# Patient Record
Sex: FEMALE | Race: WHITE
[De-identification: names, ages, dates, MRNs, and addresses within clinical notes are randomized per-mention and may not be internally consistent; named-entity substitution may affect disease eponyms.]

---

## 2017-07-06 ENCOUNTER — HOSPITAL ENCOUNTER (OUTPATIENT)
Dept: HOSPITAL 47 - MMGSC | Age: 52
Discharge: HOME | End: 2017-07-06
Payer: COMMERCIAL

## 2017-07-06 DIAGNOSIS — R53.83: Primary | ICD-10-CM

## 2017-07-06 DIAGNOSIS — Z84.81: ICD-10-CM

## 2017-07-06 DIAGNOSIS — Z83.49: ICD-10-CM

## 2017-07-06 LAB
ALP SERPL-CCNC: 67 U/L (ref 38–126)
ALT SERPL-CCNC: 36 U/L (ref 9–52)
ANION GAP SERPL CALC-SCNC: 10 MMOL/L
AST SERPL-CCNC: 26 U/L (ref 14–36)
BASOPHILS # BLD AUTO: 0 K/UL (ref 0–0.2)
BASOPHILS NFR BLD AUTO: 1 %
BUN SERPL-SCNC: 18 MG/DL (ref 7–17)
CALCIUM SPEC-MCNC: 9.6 MG/DL (ref 8.4–10.2)
CH: 30.3
CHCM: 34.7
CHLORIDE SERPL-SCNC: 108 MMOL/L (ref 98–107)
CHOLEST SERPL-MCNC: 234 MG/DL (ref ?–200)
CO2 SERPL-SCNC: 25 MMOL/L (ref 22–30)
EOSINOPHIL # BLD AUTO: 0.2 K/UL (ref 0–0.7)
EOSINOPHIL NFR BLD AUTO: 2 %
ERYTHROCYTE [DISTWIDTH] IN BLOOD BY AUTOMATED COUNT: 4.39 M/UL (ref 3.8–5.4)
ERYTHROCYTE [DISTWIDTH] IN BLOOD: 12.9 % (ref 11.5–15.5)
GLUCOSE SERPL-MCNC: 101 MG/DL (ref 74–99)
HCT VFR BLD AUTO: 38.6 % (ref 34–46)
HDLC SERPL-MCNC: 40 MG/DL (ref 40–60)
HDW: 2.82
HGB BLD-MCNC: 13.7 GM/DL (ref 11.4–16)
IRON SERPL-MCNC: 88 UG/DL (ref 37–170)
LUC NFR BLD AUTO: 1 %
LYMPHOCYTES # SPEC AUTO: 1.3 K/UL (ref 1–4.8)
LYMPHOCYTES NFR SPEC AUTO: 19 %
MCH RBC QN AUTO: 31.2 PG (ref 25–35)
MCHC RBC AUTO-ENTMCNC: 35.5 G/DL (ref 31–37)
MCV RBC AUTO: 87.9 FL (ref 80–100)
MONOCYTES # BLD AUTO: 0.3 K/UL (ref 0–1)
MONOCYTES NFR BLD AUTO: 4 %
NEUTROPHILS # BLD AUTO: 4.8 K/UL (ref 1.3–7.7)
NEUTROPHILS NFR BLD AUTO: 73 %
NON-AFRICAN AMERICAN GFR(MDRD): >60
POTASSIUM SERPL-SCNC: 4.5 MMOL/L (ref 3.5–5.1)
PROT SERPL-MCNC: 7.1 G/DL (ref 6.3–8.2)
SODIUM SERPL-SCNC: 143 MMOL/L (ref 137–145)
TIBC SERPL-MCNC: 287 UG/DL (ref 265–497)
TRIGL SERPL-MCNC: 314 MG/DL (ref ?–150)
VIT B12 SERPL-MCNC: 227 PG/ML (ref 239–931)
WBC # BLD AUTO: 0.09 10*3/UL
WBC # BLD AUTO: 6.7 K/UL (ref 3.8–10.6)
WBC (PEROX): 6.66

## 2017-07-06 PROCEDURE — 83550 IRON BINDING TEST: CPT

## 2017-07-06 PROCEDURE — 84443 ASSAY THYROID STIM HORMONE: CPT

## 2017-07-06 PROCEDURE — 80061 LIPID PANEL: CPT

## 2017-07-06 PROCEDURE — 82607 VITAMIN B-12: CPT

## 2017-07-06 PROCEDURE — 80053 COMPREHEN METABOLIC PANEL: CPT

## 2017-07-06 PROCEDURE — 84439 ASSAY OF FREE THYROXINE: CPT

## 2017-07-06 PROCEDURE — 82728 ASSAY OF FERRITIN: CPT

## 2017-07-06 PROCEDURE — 83540 ASSAY OF IRON: CPT

## 2017-07-06 PROCEDURE — 85025 COMPLETE CBC W/AUTO DIFF WBC: CPT

## 2017-07-06 PROCEDURE — 36415 COLL VENOUS BLD VENIPUNCTURE: CPT

## 2017-07-06 PROCEDURE — 81291 MTHFR GENE: CPT

## 2017-07-06 PROCEDURE — 82306 VITAMIN D 25 HYDROXY: CPT

## 2017-09-13 ENCOUNTER — HOSPITAL ENCOUNTER (OUTPATIENT)
Dept: HOSPITAL 47 - MMGSC | Age: 52
Discharge: HOME | End: 2017-09-13
Payer: COMMERCIAL

## 2017-09-13 DIAGNOSIS — Z20.2: Primary | ICD-10-CM

## 2017-09-13 LAB
HEPATITIS B CORE IGM INDEX: 0.04
HEPATITIS B SURFACE AG INDEX: 0.06
HEPATITIS C VIRUS IGG  INDEX: 0.02

## 2017-09-13 PROCEDURE — 80074 ACUTE HEPATITIS PANEL: CPT

## 2017-09-13 PROCEDURE — 87070 CULTURE OTHR SPECIMN AEROBIC: CPT

## 2017-09-13 PROCEDURE — 36415 COLL VENOUS BLD VENIPUNCTURE: CPT

## 2017-09-13 PROCEDURE — 86780 TREPONEMA PALLIDUM: CPT

## 2017-09-13 PROCEDURE — 87205 SMEAR GRAM STAIN: CPT

## 2017-09-13 PROCEDURE — 87491 CHLMYD TRACH DNA AMP PROBE: CPT

## 2017-09-13 PROCEDURE — 87591 N.GONORRHOEAE DNA AMP PROB: CPT

## 2017-09-13 PROCEDURE — 87390 HIV-1 AG IA: CPT

## 2017-09-28 ENCOUNTER — HOSPITAL ENCOUNTER (OUTPATIENT)
Dept: HOSPITAL 47 - SLEEP | Age: 52
End: 2017-09-28
Payer: COMMERCIAL

## 2017-09-28 DIAGNOSIS — G47.33: Primary | ICD-10-CM

## 2017-09-28 DIAGNOSIS — Z98.890: ICD-10-CM

## 2017-09-28 PROCEDURE — 99211 OFF/OP EST MAY X REQ PHY/QHP: CPT

## 2017-09-28 NOTE — CONS
CONSULTATION



A 52-year-old lady who has been evaluated in the [facility] for significant excessive

daytime sleepiness and obstructive sleep apnea-hypopnea syndrome.



HISTORY OF PRESENT ILLNESS/SLEEP-WAKE EVALUATION:

Patient had been diagnosed with moderate obstructive sleep apnea-hypopnea syndrome 5

years ago in different institution.  She was recommended to use CPAP, but she was not

able to use it and basically never had been treated.  At the present time, her sleep

schedule from about 10 pm to 8:10 am on weekdays and from around 10 p.m. until 11, 12

noon on weekends.  She has sometimes problems with falling asleep.  She has TV set in

bedroom.  She wakes up from sleep about 20 times with 1 episode of nocturia. She

snores, has episodes of stopped breathing with episodes of gasping for air and choking,

grinding teeth, sweating.  In the morning she wakes up tired, has difficulties to pay

attention, falling asleep during the day.  [place] Sleepiness Scale significantly

increased to 17.  Sometimes during the naps, she may have vivid dreams. Recently she

rarely takes naps because she does not have time for that.



She has problems with memory, concentration, irritability, depression, anxiety, and

sexual dysfunction.



PAST MEDICAL HISTORY:

Positive for depression, anxiety, posttraumatic stress disorder, anaphylactic shock

possibly to food or aspirin.



PAST SURGICAL HISTORY:

A right breast lumpectomy with benign results.



SOCIAL HISTORY:

Negative for smoking, alcohol consumption occasional.



MEDICATIONS:

Zoloft, Seroquel, and Xanax.



FAMILY HISTORY:

Hypertension, heart problems, hyperlipidemia, stroke, arthritis, sinus headache,

bronchitis, lung problems, sleep apnea, snoring, pneumonia, headaches, cancer,

narcolepsy, acid reflux, ulcers, diabetes mellitus, thyroid problem, mental illness,

restless legs.



REVIEW OF SYSTEMS:

Multiple awakenings from sleep, difficulties to initiate sleep, sleepiness during the

day.  Positive history of occasional dreaming during naps.  No history of sleep

paralysis. type 2.  Possible positive history of hypnagogic hallucinations.



PHYSICAL EXAM:

A 52-year-old  lady without distress.

/73, HR 76, RR 16, height 5, 7-1/2, weight 158, BMI 24.3, neck 13.5 inches in

circumference.  Temperature 98.1, oxygen saturation at room air 98%.

OROPHARYNX:  Moderately low position of soft palate.  Short distance between soft

palate and pharyngeal wall.  Some restriction of nasal breathing.

Neck  Supple, no JVD.  Thyroid is not palpable.

LUNGS  Clear to percussion and to auscultation.  Good air exchange.  No wheezing or

rhonchi.

HEART  S1, S2 regular.  No murmurs, gallops, or rubs.

ABDOMEN  Soft and nontender.  Bowel sounds are present.  No organomegaly appreciated.

EXTREMITIES  No clubbing or cyanosis.

CNS  Awake, alert, and oriented X3.  Cranial nerves 2 to 7 intact.  There is no

fasciculation or atrophy. noted.  No focal deficits observed.



IMPRESSION:

1. History of obstructive sleep apnea-hypopnea syndrome in moderate range diagnosed 5

    years ago in different institution. Snoring, awakenings with choking, gasping for

    air, short distance between soft palate and pharyngeal wall, excessive sleepiness,

    obstructive apnea-hypopnea syndrome.

2. Significant excessive daytime sleepiness.  [place] Sleepiness Scale is 17, positive

    history of hypnagogic hallucinations.

3. Family history of possible narcolepsy, hypersomnia in the differential diagnosis

    including narcolepsy.

4. History of depression.

5. History of anxiety.

6. History of posttraumatic sleep disorder or stress disorder.

7. 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

8. Status post right breast lumpectomy with benign results.



PLAN:

1. Polysomnography for evaluation of patient's breathing during sleep.

2. CPAP/BiPAP titration if sleep study confirms obstructive sleep apnea-hypopnea

    syndrome.

3. Preferable position during sleep on the side.

4. No driving if patient feels any sleepiness.  Patient is aware of  civil and

    criminal liability for unsafe driving.

5. I will see patient for follow up visit to explain results of testing and following

    plan.

6. Multiple sleep latency test if sleep study will be negative for obstructive sleep-

    apnea hypopnea syndrome.

Thank you for much for referring this patient for consultation.



Sincerely,







Cachorro Farfan MD, PhD, FAASM

Diplomat of [employer]

[employer] of [employer]

Medical Director of [facility]





MMODL / MAHAMED: [id number] / Job#: [id number]

## 2018-01-25 ENCOUNTER — HOSPITAL ENCOUNTER (OUTPATIENT)
Dept: HOSPITAL 47 - SLEEP | Age: 53
Discharge: HOME | End: 2018-01-25
Attending: INTERNAL MEDICINE
Payer: COMMERCIAL

## 2018-01-25 DIAGNOSIS — Z79.899: ICD-10-CM

## 2018-01-25 DIAGNOSIS — Z87.892: ICD-10-CM

## 2018-01-25 DIAGNOSIS — Z90.11: ICD-10-CM

## 2018-01-25 DIAGNOSIS — F43.10: ICD-10-CM

## 2018-01-25 DIAGNOSIS — G47.33: Primary | ICD-10-CM

## 2018-01-25 DIAGNOSIS — F32.9: ICD-10-CM

## 2018-01-25 NOTE — PN
PROGRESS NOTE



DATE OF SERVICE:

01/25/2018



A 52-year-old lady who has been followed in [facility] for discussing results of the

sleep studies and plan of the possible treatment.



Patient has history of moderate obstructive sleep apnea-hypopnea syndrome, diagnosed in

another institution. We did polysomnogram and multiple sleep latency test.  During

polysomnogram, loud snoring has been documented.  Apnea-hypopnea index 4.4 with lowest

oxygen level of 88%, which by today's criteria is in normal range, scoring done by 4%

oxygen desaturation criteria which we are using for the last year. Several years ago 3%

oxygen desaturation criteria possibly was used and that may change the results of the

scoring.



Multiple sleep latency test showed mean sleep latency more than 15 minutes, which is in

normal range.  No episodes of REM sleep documented during MSLT.



Patient continued to feel sleepiness during the day.  [place] Sleepiness Scale 20

today.



MEDICATIONS:

Seroquel, Zoloft, Xanax.



During physical exam, patient in no distress. /81, HR 76, RR 16, temp 98, oxygen

saturation room air 98%.  Height 5, 7, weight 158, BMI 24.7.  OROPHARYNX:  Moderately

low to normal position of soft palate. Restriction of nasal breathing.

Neck  Supple, no JVD.  Thyroid is not palpable.

LUNGS  Clear to percussion and to auscultation.  Good air exchange.  No wheezing or

rhonchi.

HEART  S1, S2 regular.  No murmurs, gallops, or rubs.

ABDOMEN  Soft and nontender.  Bowel sounds are present.  No organomegaly appreciated.

EXTREMITIES  No clubbing or cyanosis.

CNS  Awake, alert, and oriented X3.  Cranial nerves 2 to 7 intact.  There is no

fasciculation or atrophy. noted.  No focal deficits observed.



IMPRESSION:

1. Very minimal changes of the breathing during the sleep.  According to today's

    criteria, apnea-hypopnea index 4.4.  Patient indicated that 5 years ago she had a

    moderate obstructive sleep apnea-hypopnea syndrome documented in different

    institution.

2. Multiple sleep latency test in normal range.  Mean sleep latency more than 15.

3. Family history of possible narcolepsy.

4. History of depression.

5. History of anxiety.

6. History of posttraumatic stress disorder.

7. History of anaphylactic shock to food.

8. Status post right breast lumpectomy with benign results.



PLAN:

1. We will repeat home sleep apnea test for evaluation of patient's breathing during

    the sleep.

2. Sleep hygiene with regular time in bed for at least 7-12 hours.

3. No driving if feeling sleepiness.

Thank you very much for allowing me to participate in the management of your patient.



Sincerely,





Cachorro Farfan MD, PhD, FAASM

Diplomat of [employer]

American [employer] of [employer] Medicine

Medical Director of [facility] [place]





MMODL / JESSYN: [id number] / Job#: [id number]